# Patient Record
Sex: MALE | Race: WHITE | NOT HISPANIC OR LATINO | Employment: FULL TIME | URBAN - METROPOLITAN AREA
[De-identification: names, ages, dates, MRNs, and addresses within clinical notes are randomized per-mention and may not be internally consistent; named-entity substitution may affect disease eponyms.]

---

## 2021-02-04 ENCOUNTER — OFFICE VISIT (OUTPATIENT)
Dept: UROLOGY | Facility: CLINIC | Age: 66
End: 2021-02-04
Payer: MEDICARE

## 2021-02-04 VITALS
HEIGHT: 67 IN | WEIGHT: 148 LBS | DIASTOLIC BLOOD PRESSURE: 72 MMHG | BODY MASS INDEX: 23.23 KG/M2 | HEART RATE: 57 BPM | SYSTOLIC BLOOD PRESSURE: 120 MMHG

## 2021-02-04 DIAGNOSIS — N52.9 ERECTILE DYSFUNCTION, UNSPECIFIED ERECTILE DYSFUNCTION TYPE: Primary | ICD-10-CM

## 2021-02-04 PROCEDURE — 99203 OFFICE O/P NEW LOW 30 MIN: CPT | Mod: S$GLB,,, | Performed by: UROLOGY

## 2021-02-04 PROCEDURE — 3008F PR BODY MASS INDEX (BMI) DOCUMENTED: ICD-10-PCS | Mod: CPTII,S$GLB,, | Performed by: UROLOGY

## 2021-02-04 PROCEDURE — 3008F BODY MASS INDEX DOCD: CPT | Mod: CPTII,S$GLB,, | Performed by: UROLOGY

## 2021-02-04 PROCEDURE — 99203 PR OFFICE/OUTPT VISIT, NEW, LEVL III, 30-44 MIN: ICD-10-PCS | Mod: S$GLB,,, | Performed by: UROLOGY

## 2021-02-04 RX ORDER — IBUPROFEN 600 MG/1
TABLET ORAL
COMMUNITY
Start: 2021-01-22

## 2021-02-04 RX ORDER — ALPRAZOLAM 0.25 MG/1
TABLET ORAL
COMMUNITY
Start: 2020-12-21

## 2021-02-18 ENCOUNTER — OFFICE VISIT (OUTPATIENT)
Dept: UROLOGY | Facility: CLINIC | Age: 66
End: 2021-02-18
Payer: MEDICARE

## 2021-02-18 VITALS
HEART RATE: 72 BPM | BODY MASS INDEX: 23.23 KG/M2 | WEIGHT: 148 LBS | SYSTOLIC BLOOD PRESSURE: 134 MMHG | DIASTOLIC BLOOD PRESSURE: 79 MMHG | RESPIRATION RATE: 18 BRPM | HEIGHT: 67 IN

## 2021-02-18 DIAGNOSIS — N52.9 ERECTILE DYSFUNCTION, UNSPECIFIED ERECTILE DYSFUNCTION TYPE: Primary | ICD-10-CM

## 2021-02-18 PROCEDURE — 3008F PR BODY MASS INDEX (BMI) DOCUMENTED: ICD-10-PCS | Mod: CPTII,S$GLB,, | Performed by: UROLOGY

## 2021-02-18 PROCEDURE — 3008F BODY MASS INDEX DOCD: CPT | Mod: CPTII,S$GLB,, | Performed by: UROLOGY

## 2021-02-18 PROCEDURE — 99213 PR OFFICE/OUTPT VISIT, EST, LEVL III, 20-29 MIN: ICD-10-PCS | Mod: S$GLB,,, | Performed by: UROLOGY

## 2021-02-18 PROCEDURE — 99213 OFFICE O/P EST LOW 20 MIN: CPT | Mod: S$GLB,,, | Performed by: UROLOGY

## 2021-03-16 ENCOUNTER — TELEPHONE (OUTPATIENT)
Dept: UROLOGY | Facility: CLINIC | Age: 66
End: 2021-03-16

## 2021-03-19 LAB
ANION GAP SERPL CALC-SCNC: 6 MMOL/L (ref 3–11)
APPEARANCE, UA: CLEAR
BASOPHILS NFR BLD: 0.5 % (ref 0–3)
BILIRUB UR QL STRIP: NEGATIVE MG/DL
BUN SERPL-MCNC: 20 MG/DL (ref 7–18)
BUN/CREAT SERPL: 16.12 RATIO (ref 7–18)
CALCIUM BLD-MCNC: NEGATIVE MG/DL
CALCIUM SERPL-MCNC: 8.9 MG/DL (ref 8.8–10.5)
CHLORIDE SERPL-SCNC: 109 MMOL/L (ref 100–108)
CO2 SERPL-SCNC: 29 MMOL/L (ref 21–32)
COLOR UR: NORMAL
COVID-19 AB, IGM: NEGATIVE
CREAT SERPL-MCNC: 1.24 MG/DL (ref 0.7–1.3)
EOSINOPHIL NFR BLD: 1.5 % (ref 1–3)
ERYTHROCYTE [DISTWIDTH] IN BLOOD BY AUTOMATED COUNT: 13.6 % (ref 12.5–18)
GFR ESTIMATION: 59
GLUCOSE (UA): NORMAL MG/DL
GLUCOSE SERPL-MCNC: 94 MG/DL (ref 70–110)
HCT VFR BLD AUTO: 42.8 % (ref 42–52)
HGB BLD-MCNC: 13.7 G/DL (ref 14–18)
HGB UR QL STRIP: NEGATIVE /UL
KETONES UR QL STRIP: NEGATIVE MG/DL
LEUKOCYTE ESTERASE UR QL STRIP: NEGATIVE /UL
LYMPHOCYTES NFR BLD: 22.1 % (ref 25–40)
MCH RBC QN AUTO: 28.8 PG (ref 27–31.2)
MCHC RBC AUTO-ENTMCNC: 32 G/DL (ref 31.8–35.4)
MCV RBC AUTO: 90.1 FL (ref 80–97)
MONOCYTES NFR BLD: 7.3 % (ref 1–15)
NEUTROPHILS # BLD AUTO: 5.07 10*3/UL (ref 1.8–7.7)
NEUTROPHILS NFR BLD: 68.3 % (ref 37–80)
NITRITE UR QL STRIP: NEGATIVE
NUCLEATED RED BLOOD CELLS: 0 %
PATIENT EMPLOYED IN HEALTHCARE: NO
PATIENT HAS SYMPTOMS FOR CONDITION OF INTEREST: NO
PATIENT HOSPITALIZED BC COND: NO
PATIENT IN CONGREGATE CARE: NO
PH UR STRIP: 6 PH (ref 5–9)
PLATELETS: 202 10*3/UL (ref 142–424)
POTASSIUM SERPL-SCNC: 5.1 MMOL/L (ref 3.6–5.2)
PROT UR QL STRIP: NEGATIVE MG/DL
RBC # BLD AUTO: 4.75 10*6/UL (ref 4.7–6.1)
SODIUM BLD-SCNC: 144 MMOL/L (ref 135–145)
SP GR UR STRIP: 1.01 (ref 1–1.03)
SPECIMEN COLLECTION METHOD, URINE: NORMAL
UROBILINOGEN UR STRIP-ACNC: NORMAL MG/DL
WBC # BLD: 7.4 10*3/UL (ref 4.6–10.2)

## 2021-03-22 ENCOUNTER — OUTSIDE PLACE OF SERVICE (OUTPATIENT)
Dept: UROLOGY | Facility: CLINIC | Age: 66
End: 2021-03-22

## 2021-03-22 PROCEDURE — 54410 REMOVE/REPLACE PENIS PROSTH: CPT | Mod: ,,, | Performed by: UROLOGY

## 2021-03-22 PROCEDURE — 54410 PR REPLACE,INFLATABLE PENILE PROSTHESIS: ICD-10-PCS | Mod: ,,, | Performed by: UROLOGY

## 2021-03-23 ENCOUNTER — TELEPHONE (OUTPATIENT)
Dept: UROLOGY | Facility: CLINIC | Age: 66
End: 2021-03-23

## 2021-03-25 ENCOUNTER — OFFICE VISIT (OUTPATIENT)
Dept: UROLOGY | Facility: CLINIC | Age: 66
End: 2021-03-25
Payer: MEDICARE

## 2021-03-25 VITALS
HEART RATE: 67 BPM | WEIGHT: 158 LBS | DIASTOLIC BLOOD PRESSURE: 75 MMHG | SYSTOLIC BLOOD PRESSURE: 126 MMHG | HEIGHT: 67 IN | BODY MASS INDEX: 24.8 KG/M2

## 2021-03-25 DIAGNOSIS — N52.9 ERECTILE DYSFUNCTION, UNSPECIFIED ERECTILE DYSFUNCTION TYPE: Primary | ICD-10-CM

## 2021-03-25 PROCEDURE — 3008F BODY MASS INDEX DOCD: CPT | Mod: CPTII,S$GLB,, | Performed by: UROLOGY

## 2021-03-25 PROCEDURE — 3008F PR BODY MASS INDEX (BMI) DOCUMENTED: ICD-10-PCS | Mod: CPTII,S$GLB,, | Performed by: UROLOGY

## 2021-03-25 PROCEDURE — 99024 POSTOP FOLLOW-UP VISIT: CPT | Mod: S$GLB,,, | Performed by: UROLOGY

## 2021-03-25 PROCEDURE — 99024 PR POST-OP FOLLOW-UP VISIT: ICD-10-PCS | Mod: S$GLB,,, | Performed by: UROLOGY

## 2021-03-25 RX ORDER — CEFDINIR 300 MG/1
CAPSULE ORAL
COMMUNITY
Start: 2021-03-23 | End: 2023-01-26

## 2021-03-29 ENCOUNTER — TELEPHONE (OUTPATIENT)
Dept: UROLOGY | Facility: CLINIC | Age: 66
End: 2021-03-29

## 2021-03-30 ENCOUNTER — TELEPHONE (OUTPATIENT)
Dept: UROLOGY | Facility: CLINIC | Age: 66
End: 2021-03-30

## 2021-03-31 ENCOUNTER — TELEPHONE (OUTPATIENT)
Dept: UROLOGY | Facility: CLINIC | Age: 66
End: 2021-03-31

## 2022-08-25 DIAGNOSIS — N41.9 PROSTATITIS: Primary | ICD-10-CM

## 2023-01-26 ENCOUNTER — TELEPHONE (OUTPATIENT)
Dept: UROLOGY | Facility: CLINIC | Age: 68
End: 2023-01-26

## 2023-01-26 ENCOUNTER — OFFICE VISIT (OUTPATIENT)
Dept: UROLOGY | Facility: CLINIC | Age: 68
End: 2023-01-26
Payer: MEDICARE

## 2023-01-26 VITALS — DIASTOLIC BLOOD PRESSURE: 70 MMHG | HEART RATE: 61 BPM | SYSTOLIC BLOOD PRESSURE: 104 MMHG

## 2023-01-26 DIAGNOSIS — N52.9 ERECTILE DYSFUNCTION, UNSPECIFIED ERECTILE DYSFUNCTION TYPE: Primary | ICD-10-CM

## 2023-01-26 DIAGNOSIS — N40.0 BPH WITHOUT URINARY OBSTRUCTION: ICD-10-CM

## 2023-01-26 PROCEDURE — 99214 PR OFFICE/OUTPT VISIT, EST, LEVL IV, 30-39 MIN: ICD-10-PCS | Mod: S$GLB,,, | Performed by: NURSE PRACTITIONER

## 2023-01-26 PROCEDURE — 3074F PR MOST RECENT SYSTOLIC BLOOD PRESSURE < 130 MM HG: ICD-10-PCS | Mod: CPTII,S$GLB,, | Performed by: NURSE PRACTITIONER

## 2023-01-26 PROCEDURE — 1159F PR MEDICATION LIST DOCUMENTED IN MEDICAL RECORD: ICD-10-PCS | Mod: CPTII,S$GLB,, | Performed by: NURSE PRACTITIONER

## 2023-01-26 PROCEDURE — 3074F SYST BP LT 130 MM HG: CPT | Mod: CPTII,S$GLB,, | Performed by: NURSE PRACTITIONER

## 2023-01-26 PROCEDURE — 1160F PR REVIEW ALL MEDS BY PRESCRIBER/CLIN PHARMACIST DOCUMENTED: ICD-10-PCS | Mod: CPTII,S$GLB,, | Performed by: NURSE PRACTITIONER

## 2023-01-26 PROCEDURE — 99214 OFFICE O/P EST MOD 30 MIN: CPT | Mod: S$GLB,,, | Performed by: NURSE PRACTITIONER

## 2023-01-26 PROCEDURE — 1159F MED LIST DOCD IN RCRD: CPT | Mod: CPTII,S$GLB,, | Performed by: NURSE PRACTITIONER

## 2023-01-26 PROCEDURE — 3078F DIAST BP <80 MM HG: CPT | Mod: CPTII,S$GLB,, | Performed by: NURSE PRACTITIONER

## 2023-01-26 PROCEDURE — 3078F PR MOST RECENT DIASTOLIC BLOOD PRESSURE < 80 MM HG: ICD-10-PCS | Mod: CPTII,S$GLB,, | Performed by: NURSE PRACTITIONER

## 2023-01-26 PROCEDURE — 1160F RVW MEDS BY RX/DR IN RCRD: CPT | Mod: CPTII,S$GLB,, | Performed by: NURSE PRACTITIONER

## 2023-01-26 NOTE — TELEPHONE ENCOUNTER
----- Message from Kassandrajerad Hernández sent at 1/26/2023  4:29 PM CST -----  Contact: self  Type - Follow Up on Results    Patient went into clinic today and wants to know if his results are available for his PSA levels - please review and reach out to patient @ [phone] - thanks!

## 2023-01-26 NOTE — PROGRESS NOTES
Subjective:       Patient ID: Kyle Krishna Jr is a 67 y.o. male.    Chief Complaint: Follow-up (PSA)      HPI: 67-year-old male, patient Dr. Sewell, last seen in March 2021.    Patient has history of erectile dysfunction.    A penile prosthesis put in place by Dr. Sewell.  This is the patient's 3rd penile prosthesis.  Previous ones had failure.    Patient states prosthesis working well with no complaints.      It appears the patient was treated for prostatitis in August by his PCP.  At this time patient states he is doing well.  Denies any pain or burning urination.  Denies any penile pain.  Denies any difficulty voiding.  States he is a pretty good stream from start to finish.  Denies any odor urine.  Denies any fever or body aches.  Denies any blood in urine.  Denies any significant weight loss.    No other urinary complaints at this time.       Past Medical History:   Past Medical History:   Diagnosis Date    MS (multiple sclerosis)        Past Surgical Historical:   Past Surgical History:   Procedure Laterality Date    PENILE PROSTHESIS IMPLANT  2009    PENILE PROSTHESIS IMPLANT          Medications:   Medication List with Changes/Refills   Current Medications    ALPRAZOLAM (XANAX) 0.25 MG TABLET    TAKE 1 TABLET BY MOUTH THREE TIMES DAILY AS NEEDED FOR PANIC ATTACKS    IBUPROFEN (ADVIL,MOTRIN) 600 MG TABLET       Discontinued Medications    CEFDINIR (OMNICEF) 300 MG CAPSULE            Past Social History:   Social History     Socioeconomic History    Marital status:    Tobacco Use    Smoking status: Never   Substance and Sexual Activity    Alcohol use: Never       Allergies:   Review of patient's allergies indicates:   Allergen Reactions    Codeine         Family History: History reviewed. No pertinent family history.     Review of Systems:  Review of Systems   Constitutional:  Negative for activity change and appetite change.   HENT:  Negative for congestion and dental problem.    Eyes:  Negative  for visual disturbance.   Respiratory:  Negative for chest tightness and shortness of breath.    Cardiovascular:  Negative for chest pain.   Gastrointestinal:  Negative for abdominal distention and abdominal pain.   Genitourinary:  Negative for decreased urine volume, difficulty urinating, dysuria, enuresis, flank pain, frequency, genital sores, hematuria, penile discharge, penile pain, penile swelling, scrotal swelling, testicular pain and urgency.   Musculoskeletal:  Negative for back pain and neck pain.   Skin:  Negative for color change.   Neurological:  Negative for dizziness.   Hematological:  Negative for adenopathy.   Psychiatric/Behavioral:  Negative for agitation, behavioral problems and confusion.      Physical Exam:  Physical Exam  Vitals and nursing note reviewed.   Constitutional:       Appearance: He is well-developed.   HENT:      Head: Normocephalic.   Eyes:      Pupils: Pupils are equal, round, and reactive to light.   Cardiovascular:      Rate and Rhythm: Normal rate and regular rhythm.      Heart sounds: Normal heart sounds.   Pulmonary:      Effort: Pulmonary effort is normal.      Breath sounds: Normal breath sounds.   Abdominal:      General: Bowel sounds are normal.      Palpations: Abdomen is soft.   Genitourinary:     Penis: Normal.       Prostate: Enlarged (prostate is enlarged.  Prostate smooth with no nodules and nontender.  Prostate is symmetrical.).      Rectum: Normal.      Comments: Penile prosthesis is intact.  Able cycle prosthesis without difficulty and patient tolerated well.  Musculoskeletal:         General: Normal range of motion.      Cervical back: Normal range of motion and neck supple.   Skin:     General: Skin is warm and dry.   Neurological:      Mental Status: He is alert and oriented to person, place, and time.   Psychiatric:         Behavior: Behavior normal.     Urinalysis: Normal    Assessment/Plan:   1. BPH without obstruction:  Will check patient's PSA.  We will  notify him of the results.    Patient request copy sent to PCP.    2. Erectile dysfunction:  Penile prosthesis is intact.  Able to cycle prosthesis without difficulty.      Will plan follow-up in 1 year, sooner if needed.  Problem List Items Addressed This Visit    None  Visit Diagnoses       Erectile dysfunction, unspecified erectile dysfunction type    -  Primary    BPH without urinary obstruction        Relevant Orders    Prostate Specific Antigen, Diagnostic

## 2023-01-30 ENCOUNTER — TELEPHONE (OUTPATIENT)
Dept: UROLOGY | Facility: CLINIC | Age: 68
End: 2023-01-30
Payer: MEDICARE

## 2023-01-30 DIAGNOSIS — R97.20 ELEVATED PSA: Primary | ICD-10-CM

## 2023-01-30 NOTE — TELEPHONE ENCOUNTER
----- Message from Huong Enirquez sent at 1/30/2023 10:54 AM CST -----  Pt is calling in regards to getting labs results. Pt can be reached at [phone] (home)

## 2023-01-30 NOTE — TELEPHONE ENCOUNTER
Called patient with results found in media with NP, Landon Alicia recommendation of a biopsy due to elevated PSA. Patient verbalized understanding and said he would like to move forward with scheduling of biopsy.

## 2023-02-03 DIAGNOSIS — R97.20 ELEVATED PSA: Primary | ICD-10-CM

## 2023-02-03 RX ORDER — DIAZEPAM 10 MG/1
10 TABLET ORAL ONCE
Qty: 1 TABLET | Refills: 0 | Status: SHIPPED | OUTPATIENT
Start: 2023-02-13 | End: 2023-04-10

## 2023-02-03 RX ORDER — CIPROFLOXACIN 500 MG/1
500 TABLET ORAL 2 TIMES DAILY
Qty: 8 TABLET | Refills: 0 | Status: SHIPPED | OUTPATIENT
Start: 2023-02-12 | End: 2023-02-16

## 2023-02-06 ENCOUNTER — CLINICAL SUPPORT (OUTPATIENT)
Dept: UROLOGY | Facility: CLINIC | Age: 68
End: 2023-02-06
Payer: MEDICARE

## 2023-02-06 NOTE — PROGRESS NOTES
Pt here for Prostate Biopsy education.  Pre/post procedure information reviewed and discussed.  All questions answered.  Scripts for Cipro and Valium given to patient.  Consents signed.  Encouraged to call with questions or concerns.

## 2023-02-09 ENCOUNTER — TELEPHONE (OUTPATIENT)
Dept: UROLOGY | Facility: CLINIC | Age: 68
End: 2023-02-09
Payer: MEDICARE

## 2023-02-09 NOTE — TELEPHONE ENCOUNTER
"Patient stated he wants to cancel biopsy. He does not want to reschedule at this time. He stated he does not want to take the valium and "put that kind of medicine into my body"/ he stated he understands this is the way we do our biopsies but he doesn't want to do this. I educated pt that this is ordered to check/eval for possible prostate cancer due to elevated PSA and that him cancelling and not rescheduling he understands the risk. Pt verbalized understanding. HMLpn  "

## 2023-02-09 NOTE — TELEPHONE ENCOUNTER
----- Message from Jen Schroeder sent at 2/9/2023  3:03 PM CST -----  Contact: pt  Pt calling to cancel appt for Monday and  he can be reached at [phone]     Thanks,

## 2023-03-14 ENCOUNTER — TELEPHONE (OUTPATIENT)
Dept: UROLOGY | Facility: CLINIC | Age: 68
End: 2023-03-14
Payer: MEDICARE

## 2023-03-14 NOTE — TELEPHONE ENCOUNTER
----- Message from Reanna Livingston sent at 3/14/2023 10:07 AM CDT -----  Contact: self  States he does not want to do the enema and needs to see what else can be done. Please call back at [phone]

## 2023-03-14 NOTE — TELEPHONE ENCOUNTER
Pt does not want to take enema. Discussed with dR. Sewell he stated pt would need to have a good BM atleast 4 hours prior to procedure, pt verbalized understanding. He wants to be rescheduled for biopsy. Message sent to maile. Zackeryn

## 2023-04-10 ENCOUNTER — CLINICAL SUPPORT (OUTPATIENT)
Dept: UROLOGY | Facility: CLINIC | Age: 68
End: 2023-04-10
Payer: MEDICARE

## 2023-04-10 DIAGNOSIS — R97.20 ELEVATED PSA: Primary | ICD-10-CM

## 2023-04-10 RX ORDER — DIAZEPAM 10 MG/1
10 TABLET ORAL ONCE
Qty: 1 TABLET | Refills: 0 | Status: SHIPPED | OUTPATIENT
Start: 2023-04-10 | End: 2023-04-10

## 2023-04-10 RX ORDER — POLYETHYLENE GLYCOL 3350, SODIUM SULFATE ANHYDROUS, SODIUM BICARBONATE, SODIUM CHLORIDE, POTASSIUM CHLORIDE 236; 22.74; 6.74; 5.86; 2.97 G/4L; G/4L; G/4L; G/4L; G/4L
240 POWDER, FOR SOLUTION ORAL ONCE
Qty: 240 ML | Refills: 0 | Status: SHIPPED | OUTPATIENT
Start: 2023-04-10 | End: 2023-04-10

## 2023-04-10 RX ORDER — POLYETHYLENE GLYCOL 3350, SODIUM SULFATE ANHYDROUS, SODIUM BICARBONATE, SODIUM CHLORIDE, POTASSIUM CHLORIDE 236; 22.74; 6.74; 5.86; 2.97 G/4L; G/4L; G/4L; G/4L; G/4L
240 POWDER, FOR SOLUTION ORAL ONCE
Qty: 240 ML | Refills: 0 | Status: CANCELLED | OUTPATIENT
Start: 2023-04-10 | End: 2023-04-10

## 2023-04-10 RX ORDER — CIPROFLOXACIN 500 MG/1
500 TABLET ORAL 2 TIMES DAILY
Qty: 8 TABLET | Refills: 0 | Status: CANCELLED | OUTPATIENT
Start: 2023-04-10

## 2023-04-10 RX ORDER — CIPROFLOXACIN 500 MG/1
500 TABLET ORAL 2 TIMES DAILY
Qty: 8 TABLET | Refills: 0 | Status: SHIPPED | OUTPATIENT
Start: 2023-04-10

## 2023-04-10 RX ORDER — DIAZEPAM 10 MG/1
10 TABLET ORAL ONCE
Qty: 1 TABLET | Refills: 0 | Status: CANCELLED | OUTPATIENT
Start: 2023-04-10 | End: 2023-04-10

## 2023-04-17 ENCOUNTER — TELEPHONE (OUTPATIENT)
Dept: UROLOGY | Facility: CLINIC | Age: 68
End: 2023-04-17
Payer: MEDICARE

## 2023-04-17 NOTE — TELEPHONE ENCOUNTER
Pt calling regarding his valium. He asked if he could take his xanax as well. I asked pt to please not take this due to taking valium. Pt verbalized understanding.     ----- Message from Dennise Godinez sent at 4/17/2023 10:55 AM CDT -----  Contact: Patient  Patient called to consult with nurse or staff regarding his upcoming biopsy. He would like to speak with nurse about the medication he can take once he is at the office for the procedure and would like a call back. He can be reached at [phone]. Thanks/MR

## 2023-04-24 ENCOUNTER — TELEPHONE (OUTPATIENT)
Dept: UROLOGY | Facility: CLINIC | Age: 68
End: 2023-04-24
Payer: MEDICARE

## 2023-04-24 NOTE — TELEPHONE ENCOUNTER
Pt calling when to drink the golitley. Informed pt to start drinking tonight at 5.     ----- Message from Tess Manzano sent at 4/24/2023  9:56 AM CDT -----  Patient would like a call back concerning pre for procedure tomorrow..Please call him back at [phone]

## 2023-04-25 ENCOUNTER — PROCEDURE VISIT (OUTPATIENT)
Dept: UROLOGY | Facility: CLINIC | Age: 68
End: 2023-04-25
Payer: MEDICARE

## 2023-04-25 VITALS
OXYGEN SATURATION: 97 % | SYSTOLIC BLOOD PRESSURE: 155 MMHG | RESPIRATION RATE: 16 BRPM | WEIGHT: 159.81 LBS | HEART RATE: 71 BPM | DIASTOLIC BLOOD PRESSURE: 73 MMHG | BODY MASS INDEX: 25.03 KG/M2

## 2023-04-25 DIAGNOSIS — R97.20 ELEVATED PSA: Primary | ICD-10-CM

## 2023-04-25 PROCEDURE — 76872 US TRANSRECTAL: CPT | Mod: S$GLB,,, | Performed by: UROLOGY

## 2023-04-25 PROCEDURE — 55700 TRANSRECTAL ULTRASOUND W/ BIOPSY: ICD-10-PCS | Mod: S$GLB,,, | Performed by: UROLOGY

## 2023-04-25 PROCEDURE — 76872 TRANSRECTAL ULTRASOUND W/ BIOPSY: ICD-10-PCS | Mod: S$GLB,,, | Performed by: UROLOGY

## 2023-04-25 PROCEDURE — 55700 TRANSRECTAL ULTRASOUND W/ BIOPSY: CPT | Mod: S$GLB,,, | Performed by: UROLOGY

## 2023-04-25 NOTE — PROCEDURES
"Transrectal Ultrasound w/ Biopsy    Date/Time: 4/25/2023 2:30 PM  Performed by: Damien Sewell MD  Authorized by: Landon Alicia NP     Consent Done?:  Yes (Written)  Time out: Immediately prior to procedure a "time out" was called to verify the correct patient, procedure, equipment, support staff and site/side marked as required.    Indications: Elevated PSA    Position:  Left lateral  Anesthesia:  Pudendal nerve block  Patient sedated: No    Prostate Size:  35g  Left Base Biopsies: 2  Left Mid Biopsies: 2  Left [place] Biopsies: 2  Right Base Biopsies: 2  Right Mid Biopsies: 2  Right [place] Biopsies: 2  Total Biopsies:  12    Patient tolerance:  Patient tolerated the procedure well with no immediate complications     Patient was brought to the procedure room placed on the table in left lateral decubitus position lidocaine jelly was instilled into the rectum the ultrasound probe was advanced to level of prostate and a total of 10 cc of lidocaine was injected into the bilateral nam prostatic fossa.  A standard 12 core prostate biopsy was obtained patient tolerated the procedure well there were no complications  "

## 2023-04-25 NOTE — PATIENT INSTRUCTIONS
Patient Education       Prostate Biopsy Discharge Instructions   About this topic   The prostate is a part of your body that helps make semen. The prostate is located at the base of the penis and in front of the rectum.  Prostate biopsy is done:  To help your doctor know if the lump or tumor in your prostate is cancer or not.  If your blood test, called PSA or prostate specific antigen, is high. High PSA in the blood means disease in the prostate.  During a prostate biopsy, the doctor uses a needle to collect pieces of tissue from the prostate. The doctor sends the tissue to the lab. The lab then checks the tissue for infection or cancer.     What care is needed at home?   Ask your doctor what you need to do when you go home. Make sure you ask questions if you do not understand what the doctor says. This way you will know what you need to do.  Rest after the procedure to prevent bleeding from the biopsy site. Avoid activities like heavy lifting and hard exercise.  You may see some blood in your urine or stools for the next few days. You may also have blood in your semen for a few weeks.  Drink up to 8 glasses of water a day to help flush out blood.  Use an ice pack to help with the pain and to help stop the bleeding for the first 2 days after the procedure. Place an ice pack or a bag of frozen peas wrapped in a towel over the painful part. Never put ice right on the skin. Do not leave the ice on more than 10 to 15 minutes at a time. Use ice each hour as needed.  Keep your rectal opening and penis clean to prevent infection.  Keep your wound dry for the next 24 hours. Ask your doctor about when you may take a bath or shower.  Change the dressing if it gets soaked.  What follow-up care is needed?   Your doctor may ask you to make visits to the office to check on your progress. Be sure to keep your visits.  It may take up to 2 weeks for your doctor to get the results. You may be asked to return to the doctor's office  for the result of the biopsy in 2 to 3 weeks. The results will help your doctor understand what kind of problem you have with your prostate. Together you can make a plan for more care.  Your doctor will talk with you if any other treatment is needed.  What drugs may be needed?   The doctor may order drugs to:  Help with pain  Prevent infection  Will physical activity be limited?   Avoid doing activities that may put pressure on your rectal area for the next 7 days. You may be more comfortable if you do not ride a bike, horse, or motorcycle.  Limit your sexual activity for a few days after the procedure. Ask your doctor when you can have sex.  Do not strain when going to the bathroom. Don't hold your urine. Holding back from urinating can irritate your bladder and lead to a urinary tract infection.  Avoid constipation by eating foods high in fiber and staying hydrated. Straining to pass stool can worsen your symptoms as you heal.  What problems could happen?   Infection at the biopsy site  Infection elsewhere in your body  Bleeding from your rectum, or blood in your urine or semen  Tumor spread  Very bad pain  Bladder or rectum perforation  Urinary tract infection  Trouble passing urine  Erectile dysfunction  Reduced sexual activity  When do I need to call the doctor?   Signs of infection like fever of 100.4°F (38°C) or higher, chills, burning or pain when you pass urine.  No urine or more problems passing urine  Dizziness, confusion, or weakness  Yellowish, greenish, or bloody discharge from the penis  Lots of rectal bleeding or large amounts of blood in the urine  Pain does not go away even after taking your drugs  Teach Back: Helping You Understand   The Teach Back Method helps you understand the information we are giving you. After you talk with the staff, tell them in your own words what you learned. This helps to make sure the staff has described each thing clearly. It also helps to explain things that may have  been confusing. Before going home, make sure you can do these:  I can tell you about my procedure.  I can tell you what may help ease my pain.  I can tell you what I will do if I have a fever, chills, problems passing urine, or drainage from my penis.  NO LIFTING GREATER THAN 10 LBS  NO STRENUOUS ACTIVITY FOR 3 DAYS  NO SEXUAL ACTIVITY FOR 7 DAYS    Where can I learn more?   American Cancer Society  https://www.cancer.org/cancer/prostate-cancer/jxypjaxdk-acykwgggu-abskcrg/how-diagnosed.html   [employer] of [place]  https://www.radiologyinfo.org/en/info.cfm?pg=prostate-biopsy   Last Reviewed Date   2021-05-18  Consumer Information Use and Disclaimer   This information is not specific medical advice and does not replace information you receive from your health care provider. This is only a brief summary of general information. It does NOT include all information about conditions, illnesses, injuries, tests, procedures, treatments, therapies, discharge instructions or life-style choices that may apply to you. You must talk with your health care provider for complete information about your health and treatment options. This information should not be used to decide whether or not to accept your health care providers advice, instructions or recommendations. Only your health care provider has the knowledge and training to provide advice that is right for you.  Copyright   Copyright © 2021 [employer] and its affiliates and/or licensors. All rights reserved.

## 2023-05-02 ENCOUNTER — OFFICE VISIT (OUTPATIENT)
Dept: UROLOGY | Facility: CLINIC | Age: 68
End: 2023-05-02
Payer: MEDICARE

## 2023-05-02 ENCOUNTER — TELEPHONE (OUTPATIENT)
Dept: UROLOGY | Facility: CLINIC | Age: 68
End: 2023-05-02

## 2023-05-02 DIAGNOSIS — C61 PROSTATE CANCER: Primary | ICD-10-CM

## 2023-05-02 PROCEDURE — 99214 PR OFFICE/OUTPT VISIT, EST, LEVL IV, 30-39 MIN: ICD-10-PCS | Mod: S$GLB,,, | Performed by: UROLOGY

## 2023-05-02 PROCEDURE — 1160F RVW MEDS BY RX/DR IN RCRD: CPT | Mod: CPTII,S$GLB,, | Performed by: UROLOGY

## 2023-05-02 PROCEDURE — 1159F PR MEDICATION LIST DOCUMENTED IN MEDICAL RECORD: ICD-10-PCS | Mod: CPTII,S$GLB,, | Performed by: UROLOGY

## 2023-05-02 PROCEDURE — 1160F PR REVIEW ALL MEDS BY PRESCRIBER/CLIN PHARMACIST DOCUMENTED: ICD-10-PCS | Mod: CPTII,S$GLB,, | Performed by: UROLOGY

## 2023-05-02 PROCEDURE — 99214 OFFICE O/P EST MOD 30 MIN: CPT | Mod: S$GLB,,, | Performed by: UROLOGY

## 2023-05-02 PROCEDURE — 1159F MED LIST DOCD IN RCRD: CPT | Mod: CPTII,S$GLB,, | Performed by: UROLOGY

## 2023-05-02 NOTE — PROGRESS NOTES
Subjective:       Patient ID: Kyle Krishna Jr is a 67 y.o. male.    Chief Complaint: Biopsy results(initial)      HPI:  67-year-old male with initial diagnosis of low-grade prostate cancer Brian 6 in 1 of 12 cores 36% PSA was 5.37 he is here for further discussion    Past Medical History:   Past Medical History:   Diagnosis Date    MS (multiple sclerosis)        Past Surgical Historical:   Past Surgical History:   Procedure Laterality Date    PENILE PROSTHESIS IMPLANT  2009    PENILE PROSTHESIS IMPLANT          Medications:   Medication List with Changes/Refills   Current Medications    ALPRAZOLAM (XANAX) 0.25 MG TABLET    TAKE 1 TABLET BY MOUTH THREE TIMES DAILY AS NEEDED FOR PANIC ATTACKS    CIPROFLOXACIN HCL (CIPRO) 500 MG TABLET    Take 1 tablet (500 mg total) by mouth 2 (two) times daily.    DIAZEPAM (VALIUM) 10 MG TAB    Take 1 tablet (10 mg total) by mouth once. for 1 dose    IBUPROFEN (ADVIL,MOTRIN) 600 MG TABLET            Past Social History:   Social History     Socioeconomic History    Marital status:    Tobacco Use    Smoking status: Never   Substance and Sexual Activity    Alcohol use: Never       Allergies:   Review of patient's allergies indicates:   Allergen Reactions    Codeine         Family History: History reviewed. No pertinent family history.     Review of Systems:  Review of Systems   Constitutional:  Negative for activity change and appetite change.   HENT:  Negative for congestion and dental problem.    Eyes:  Negative for visual disturbance.   Respiratory:  Negative for chest tightness and shortness of breath.    Cardiovascular:  Negative for chest pain.   Gastrointestinal:  Negative for abdominal distention and abdominal pain.   Endocrine: Negative for polyuria.   Genitourinary:  Negative for difficulty urinating, dysuria, flank pain, frequency, hematuria, penile discharge, penile pain, penile swelling, scrotal swelling, testicular pain and urgency.   Musculoskeletal:   Negative for back pain and neck pain.   Skin:  Negative for color change.   Allergic/Immunologic: Positive for immunocompromised state.   Neurological:  Negative for dizziness.   Hematological:  Negative for adenopathy.   Psychiatric/Behavioral:  Negative for agitation, behavioral problems and confusion.      Physical Exam:  Physical Exam  Constitutional:       General: He is not in acute distress.     Appearance: He is well-developed.   HENT:      Head: Normocephalic and atraumatic.      Nose: Nose normal.   Eyes:      General: No scleral icterus.     Conjunctiva/sclera: Conjunctivae normal.      Pupils: Pupils are equal, round, and reactive to light.   Neck:      Thyroid: No thyromegaly.      Trachea: No tracheal deviation.   Cardiovascular:      Rate and Rhythm: Normal rate and regular rhythm.      Heart sounds: Normal heart sounds.   Pulmonary:      Effort: Pulmonary effort is normal. No respiratory distress.      Breath sounds: Normal breath sounds. No wheezing or rales.   Abdominal:      General: Bowel sounds are normal. There is no distension.      Palpations: Abdomen is soft.      Tenderness: There is no abdominal tenderness. There is no guarding or rebound.   Genitourinary:     Penis: Normal. No tenderness.       Prostate: Normal.   Musculoskeletal:         General: No deformity. Normal range of motion.      Cervical back: Neck supple.   Lymphadenopathy:      Cervical: No cervical adenopathy.   Skin:     General: Skin is warm and dry.      Findings: No erythema or rash.   Neurological:      Mental Status: He is alert and oriented to person, place, and time.      Cranial Nerves: No cranial nerve deficit.   Psychiatric:         Behavior: Behavior normal.       Assessment/Plan:       Problem List Items Addressed This Visit    None  Visit Diagnoses       Prostate cancer    -  Primary               I had a long discussion with the patient regarding treatment for prostate cancer and all of the available options.   We discussed external beam radiation and all of the risks and benefits, discussed brachytherapy as well as robotic assisted radical prostatectomy.  We had a long discussion on surgery itself including the risks of worsening erectile dysfunction, urinary incontinence, injury to the bowel or anastomotic leak.  All questions were answered the patient reported having a clear understanding of the treatment of prostate cancer and the risk and benefits of each modality.  The appropriate amountof time was spent in face-to-face communication discussing the treatment for prostate cancer.  Decision was made to proceed with active surveillance we will repeat PSA in 4 months

## 2023-05-02 NOTE — TELEPHONE ENCOUNTER
Contacted pt asked what kind of mower he has, he states he has a automatic push mower, advised that is fine. Pt states it pushes itself. Verbalized understanding. BJP    ----- Message from Tess Manzano sent at 5/2/2023  3:30 PM CDT -----  Patient would like a call back concerning if he can cut grass..Please call him back at [phone]

## 2023-05-05 ENCOUNTER — TELEPHONE (OUTPATIENT)
Dept: UROLOGY | Facility: CLINIC | Age: 68
End: 2023-05-05
Payer: MEDICARE

## 2023-05-05 NOTE — TELEPHONE ENCOUNTER
Returned request for call back. Patient wanted to know when he could resume his regular exercise routine since having prostate biopsy on 4/25/2023. Instructed patient that he was only on streneous activity restrictions for 3-4 days & that he could resume normal exercise routine. Verbalized understanding.               ----- Message from Tess Manzano sent at 5/5/2023 10:15 AM CDT -----  Patient is calling in regards to when can he resume his normal routine of exercise..Please call him back at [phone]

## 2023-09-06 ENCOUNTER — CLINICAL SUPPORT (OUTPATIENT)
Dept: UROLOGY | Facility: CLINIC | Age: 68
End: 2023-09-06
Payer: MEDICARE

## 2023-09-06 DIAGNOSIS — C61 PROSTATE CANCER: Primary | ICD-10-CM

## 2023-09-06 LAB — PSA, DIAGNOSTIC: 5.65 NG/ML (ref 0.1–4)

## 2023-09-06 PROCEDURE — 36415 COLL VENOUS BLD VENIPUNCTURE: CPT | Mod: S$GLB,,, | Performed by: UROLOGY

## 2023-09-06 PROCEDURE — 36415 PR COLLECTION VENOUS BLOOD,VENIPUNCTURE: ICD-10-PCS | Mod: S$GLB,,, | Performed by: UROLOGY

## 2023-09-06 NOTE — PROGRESS NOTES
PSA draw done via Left F/A with 23 ga butterlfy stick x 1.  Gauze and coban applied.  Tolerated well.

## 2023-09-14 ENCOUNTER — TELEPHONE (OUTPATIENT)
Dept: UROLOGY | Facility: CLINIC | Age: 68
End: 2023-09-14

## 2023-09-14 ENCOUNTER — OFFICE VISIT (OUTPATIENT)
Dept: UROLOGY | Facility: CLINIC | Age: 68
End: 2023-09-14
Payer: MEDICARE

## 2023-09-14 VITALS
OXYGEN SATURATION: 98 % | TEMPERATURE: 98 F | DIASTOLIC BLOOD PRESSURE: 78 MMHG | HEART RATE: 56 BPM | WEIGHT: 159 LBS | BODY MASS INDEX: 24.96 KG/M2 | HEIGHT: 67 IN | SYSTOLIC BLOOD PRESSURE: 144 MMHG

## 2023-09-14 DIAGNOSIS — C61 PROSTATE CANCER: Primary | ICD-10-CM

## 2023-09-14 PROCEDURE — 3008F PR BODY MASS INDEX (BMI) DOCUMENTED: ICD-10-PCS | Mod: CPTII,S$GLB,, | Performed by: UROLOGY

## 2023-09-14 PROCEDURE — 3008F BODY MASS INDEX DOCD: CPT | Mod: CPTII,S$GLB,, | Performed by: UROLOGY

## 2023-09-14 PROCEDURE — 3078F DIAST BP <80 MM HG: CPT | Mod: CPTII,S$GLB,, | Performed by: UROLOGY

## 2023-09-14 PROCEDURE — 1126F PR PAIN SEVERITY QUANTIFIED, NO PAIN PRESENT: ICD-10-PCS | Mod: CPTII,S$GLB,, | Performed by: UROLOGY

## 2023-09-14 PROCEDURE — 1159F PR MEDICATION LIST DOCUMENTED IN MEDICAL RECORD: ICD-10-PCS | Mod: CPTII,S$GLB,, | Performed by: UROLOGY

## 2023-09-14 PROCEDURE — 1126F AMNT PAIN NOTED NONE PRSNT: CPT | Mod: CPTII,S$GLB,, | Performed by: UROLOGY

## 2023-09-14 PROCEDURE — 3078F PR MOST RECENT DIASTOLIC BLOOD PRESSURE < 80 MM HG: ICD-10-PCS | Mod: CPTII,S$GLB,, | Performed by: UROLOGY

## 2023-09-14 PROCEDURE — 99214 PR OFFICE/OUTPT VISIT, EST, LEVL IV, 30-39 MIN: ICD-10-PCS | Mod: S$GLB,,, | Performed by: UROLOGY

## 2023-09-14 PROCEDURE — 3077F SYST BP >= 140 MM HG: CPT | Mod: CPTII,S$GLB,, | Performed by: UROLOGY

## 2023-09-14 PROCEDURE — 1159F MED LIST DOCD IN RCRD: CPT | Mod: CPTII,S$GLB,, | Performed by: UROLOGY

## 2023-09-14 PROCEDURE — 3077F PR MOST RECENT SYSTOLIC BLOOD PRESSURE >= 140 MM HG: ICD-10-PCS | Mod: CPTII,S$GLB,, | Performed by: UROLOGY

## 2023-09-14 PROCEDURE — 99214 OFFICE O/P EST MOD 30 MIN: CPT | Mod: S$GLB,,, | Performed by: UROLOGY

## 2023-09-14 NOTE — PROGRESS NOTES
Subjective:       Patient ID: Kyle Krishna Jr is a 68 y.o. male.    Chief Complaint: Elevated PSA      HPI: 68-year-old male patient presenting to clinic for 4 month follow up after initial diagnosis of low-grade prostate cancer [place] 6 in 1 of 12 cores.  He is here for repeat PSA.  At his last visit he chose to continue with active surveillance.    09/06/2023  5.65  01/26/2023  5.37    04/25/2023 initial biopsy 1/12 cores [place] 636% PSA was 5.37       Past Medical History:   Past Medical History:   Diagnosis Date    MS (multiple sclerosis)        Past Surgical Historical:   Past Surgical History:   Procedure Laterality Date    PENILE PROSTHESIS IMPLANT  2009    PENILE PROSTHESIS IMPLANT          Medications:   Medication List with Changes/Refills   Current Medications    ALPRAZOLAM (XANAX) 0.25 MG TABLET    TAKE 1 TABLET BY MOUTH THREE TIMES DAILY AS NEEDED FOR PANIC ATTACKS    CIPROFLOXACIN HCL (CIPRO) 500 MG TABLET    Take 1 tablet (500 mg total) by mouth 2 (two) times daily.    DIAZEPAM (VALIUM) 10 MG TAB    Take 1 tablet (10 mg total) by mouth once. for 1 dose    IBUPROFEN (ADVIL,MOTRIN) 600 MG TABLET            Past Social History:   Social History     Socioeconomic History    Marital status:    Tobacco Use    Smoking status: Never   Substance and Sexual Activity    Alcohol use: Never       Allergies:   Review of patient's allergies indicates:   Allergen Reactions    Codeine         Family History: History reviewed. No pertinent family history.     Review of Systems:  Review of Systems   Constitutional: Negative.    HENT: Negative.     Eyes: Negative.    Respiratory: Negative.     Cardiovascular: Negative.    Gastrointestinal: Negative.    Endocrine: Negative.    Genitourinary: Negative.    Musculoskeletal: Negative.    Skin: Negative.    Allergic/Immunologic: Negative.    Neurological: Negative.    Hematological: Negative.    Psychiatric/Behavioral: Negative.         Physical Exam:  Physical  Exam  Constitutional:       Appearance: He is normal weight.   HENT:      Head: Normocephalic.      Nose: Nose normal.      Mouth/Throat:      Mouth: Mucous membranes are moist.      Pharynx: Oropharynx is clear.   Eyes:      Extraocular Movements: Extraocular movements intact.      Conjunctiva/sclera: Conjunctivae normal.      Pupils: Pupils are equal, round, and reactive to light.   Cardiovascular:      Rate and Rhythm: Normal rate and regular rhythm.      Pulses: Normal pulses.      Heart sounds: Normal heart sounds.   Pulmonary:      Effort: Pulmonary effort is normal.      Breath sounds: Normal breath sounds.   Abdominal:      General: Abdomen is flat. Bowel sounds are normal.      Palpations: Abdomen is soft.      Hernia: There is no hernia in the right inguinal area or left inguinal area.   Genitourinary:     Penis: Normal. No phimosis, paraphimosis, hypospadias, erythema, tenderness or discharge.       Testes: Normal.         Right: Mass, tenderness or swelling not present. Right testis is descended. Cremasteric reflex is present.          Left: Mass, tenderness or swelling not present. Left testis is descended. Cremasteric reflex is present.       Prostate: Normal.      Rectum: Normal.   Musculoskeletal:         General: Normal range of motion.      Cervical back: Normal range of motion and neck supple.   Lymphadenopathy:      Lower Body: No right inguinal adenopathy. No left inguinal adenopathy.   Skin:     General: Skin is warm and dry.      Capillary Refill: Capillary refill takes less than 2 seconds.   Neurological:      General: No focal deficit present.      Mental Status: He is alert and oriented to person, place, and time. Mental status is at baseline.   Psychiatric:         Mood and Affect: Mood normal.         Behavior: Behavior normal.         Thought Content: Thought content normal.         Judgment: Judgment normal.       Assessment/Plan:     Prostate cancer:  Continue with active surveillance.   Return to clinic in 4 months for repeat PSA.  Patient will be due for repeat biopsy in April of 2024.  No other issues at this time.    I, Dr. Damien Sewell have seen and personally evaluated the patient. I have formulated the plan reviewed all pertinent imaging and clinical data.  I agree with the nurse practitioner's assessment, and I have personally formulated the plan for this patient's care as described by the midlevel.    We would a discussion about treating this patient is low risk prostate cancer as his kids are concerned about why he is not having surgery.  For now we will repeat PSA in 4 months his biopsy will be due in April of 2024 we will make decisions thereafter  Problem List Items Addressed This Visit    None

## 2023-09-14 NOTE — TELEPHONE ENCOUNTER
Spoke with pt and informed him that we have sent results over.     ----- Message from Shadia Enriquez sent at 9/14/2023  2:01 PM CDT -----  Contact: Kyle Wright is calling to get his results fax to his PCP Dr.Jason Moreno at [phone] and or call him the patient at [phone].    Thanks  Td

## 2023-09-25 ENCOUNTER — TELEPHONE (OUTPATIENT)
Dept: UROLOGY | Facility: CLINIC | Age: 68
End: 2023-09-25
Payer: MEDICARE

## 2023-09-25 NOTE — TELEPHONE ENCOUNTER
Contacted pt, he's requesting copy of PSA results. Advised that I would print for him to pickup. Pt states he's having rectal pain, asked if it was just the rectum or is it pain to testicle, scrotum. Pt states just rectal where feces exit. Advised pt that he may need to f/u with his primary. PT states he have, they will be proceeding with Colonoscopy. BJP    ----- Message from Tess Manzano sent at 9/25/2023  8:39 AM CDT -----  Patient is calling in regards to would like a copy of last labs...Please call him back at [phone].            Thanks  [facility]

## 2023-10-20 ENCOUNTER — TELEPHONE (OUTPATIENT)
Dept: UROLOGY | Facility: CLINIC | Age: 68
End: 2023-10-20
Payer: MEDICARE

## 2023-10-20 NOTE — TELEPHONE ENCOUNTER
Pt called asking if he could take an over the counter testosterone supplement. Pt was diagnosed with [place] 6 prostate cancer. I spoke with provider and she stated pt should not take this due to prostate cancer feeding off of testosterone and that could increase his risk of high dotty. Pt verbalized understand.

## 2023-10-20 NOTE — TELEPHONE ENCOUNTER
Attempted to return pt call, left [facility].     ----- Message from Jen Schroeder sent at 10/20/2023  1:22 PM CDT -----  Contact: pt  Pt calling wanting to know if it will hurt him if he taken nugenix.  Pt can be reached at [phone] .    Thanks,

## 2024-01-17 ENCOUNTER — CLINICAL SUPPORT (OUTPATIENT)
Dept: UROLOGY | Facility: CLINIC | Age: 69
End: 2024-01-17
Payer: MEDICARE

## 2024-01-17 DIAGNOSIS — R97.20 ELEVATED PSA: ICD-10-CM

## 2024-01-17 DIAGNOSIS — C61 PROSTATE CANCER: Primary | ICD-10-CM

## 2024-01-17 LAB — PSA, DIAGNOSTIC: 5.02 NG/ML (ref 0.1–4)

## 2024-01-17 PROCEDURE — 36415 COLL VENOUS BLD VENIPUNCTURE: CPT | Mod: S$GLB,,, | Performed by: UROLOGY

## 2024-01-17 NOTE — PROGRESS NOTES
Short visit for PSA draw only. Blood taken from left forearm with no adverse reactions or concerns.

## 2024-01-23 ENCOUNTER — OFFICE VISIT (OUTPATIENT)
Dept: UROLOGY | Facility: CLINIC | Age: 69
End: 2024-01-23
Payer: MEDICARE

## 2024-01-23 VITALS — HEIGHT: 67 IN | WEIGHT: 156 LBS | BODY MASS INDEX: 24.48 KG/M2

## 2024-01-23 DIAGNOSIS — C61 PROSTATE CANCER: Primary | ICD-10-CM

## 2024-01-23 PROCEDURE — 99214 OFFICE O/P EST MOD 30 MIN: CPT | Mod: S$GLB,,, | Performed by: UROLOGY

## 2024-01-23 PROCEDURE — 1126F AMNT PAIN NOTED NONE PRSNT: CPT | Mod: CPTII,S$GLB,, | Performed by: UROLOGY

## 2024-01-23 PROCEDURE — 3288F FALL RISK ASSESSMENT DOCD: CPT | Mod: CPTII,S$GLB,, | Performed by: UROLOGY

## 2024-01-23 PROCEDURE — 3008F BODY MASS INDEX DOCD: CPT | Mod: CPTII,S$GLB,, | Performed by: UROLOGY

## 2024-01-23 PROCEDURE — 1159F MED LIST DOCD IN RCRD: CPT | Mod: CPTII,S$GLB,, | Performed by: UROLOGY

## 2024-01-23 PROCEDURE — 1101F PT FALLS ASSESS-DOCD LE1/YR: CPT | Mod: CPTII,S$GLB,, | Performed by: UROLOGY

## 2024-01-23 RX ORDER — HYOSCYAMINE SULFATE 0.125 MG
125 TABLET ORAL
COMMUNITY
Start: 2023-11-29

## 2024-01-23 RX ORDER — ALPRAZOLAM 0.5 MG/1
0.5 TABLET ORAL
COMMUNITY
Start: 2023-12-24

## 2024-01-23 NOTE — PROGRESS NOTES
Subjective:       Patient ID: Kyle Krishna Jr is a 68 y.o. male.    Chief Complaint: No chief complaint on file.      HPI: 68-year-old male patient presenting to clinic for 4 month follow up for prostate cancer on active surveillance diagnosis of low-grade prostate cancer [place] 6 in 1 of 12 cores.     01/17/2024  5.02  09/06/2023  5.65  01/26/2023  5.37    04/25/2023 initial biopsy 1/12 cores [place] 6 36% PSA was 5.37         Past Medical History:   Past Medical History:   Diagnosis Date    MS (multiple sclerosis)        Past Surgical Historical:   Past Surgical History:   Procedure Laterality Date    PENILE PROSTHESIS IMPLANT  2009    PENILE PROSTHESIS IMPLANT          Medications:   Medication List with Changes/Refills   Current Medications    ALPRAZOLAM (XANAX) 0.25 MG TABLET    TAKE 1 TABLET BY MOUTH THREE TIMES DAILY AS NEEDED FOR PANIC ATTACKS    ALPRAZOLAM (XANAX) 0.5 MG TABLET    Take 0.5 mg by mouth.    CIPROFLOXACIN HCL (CIPRO) 500 MG TABLET    Take 1 tablet (500 mg total) by mouth 2 (two) times daily.    DIAZEPAM (VALIUM) 10 MG TAB    Take 1 tablet (10 mg total) by mouth once. for 1 dose    HYOSCYAMINE (ANASPAZ,LEVSIN) 0.125 MG TAB    Take 125 mcg by mouth.    IBUPROFEN (ADVIL,MOTRIN) 600 MG TABLET            Past Social History:   Social History     Socioeconomic History    Marital status:    Tobacco Use    Smoking status: Never   Substance and Sexual Activity    Alcohol use: Never       Allergies:   Review of patient's allergies indicates:   Allergen Reactions    Codeine         Family History: History reviewed. No pertinent family history.     Review of Systems:  Review of Systems   Constitutional: Negative.    HENT: Negative.     Eyes: Negative.    Respiratory: Negative.     Cardiovascular: Negative.    Gastrointestinal: Negative.    Endocrine: Negative.    Genitourinary: Negative.    Musculoskeletal: Negative.    Skin: Negative.    Allergic/Immunologic: Negative.    Neurological:  Negative.    Hematological: Negative.    Psychiatric/Behavioral: Negative.         Physical Exam:  Physical Exam  Constitutional:       Appearance: He is normal weight.   HENT:      Head: Normocephalic.      Nose: Nose normal.      Mouth/Throat:      Mouth: Mucous membranes are moist.      Pharynx: Oropharynx is clear.   Eyes:      Extraocular Movements: Extraocular movements intact.      Conjunctiva/sclera: Conjunctivae normal.      Pupils: Pupils are equal, round, and reactive to light.   Cardiovascular:      Rate and Rhythm: Normal rate and regular rhythm.      Pulses: Normal pulses.      Heart sounds: Normal heart sounds.   Pulmonary:      Effort: Pulmonary effort is normal.      Breath sounds: Normal breath sounds.   Abdominal:      General: Abdomen is flat. Bowel sounds are normal.      Palpations: Abdomen is soft.      Hernia: There is no hernia in the right inguinal area or left inguinal area.   Genitourinary:     Penis: Normal. No phimosis, paraphimosis, hypospadias, erythema, tenderness or discharge.       Testes: Normal.         Right: Mass, tenderness or swelling not present. Right testis is descended. Cremasteric reflex is present.          Left: Mass, tenderness or swelling not present. Left testis is descended. Cremasteric reflex is present.       Prostate: Normal.      Rectum: Normal.   Musculoskeletal:         General: Normal range of motion.      Cervical back: Normal range of motion and neck supple.   Lymphadenopathy:      Lower Body: No right inguinal adenopathy. No left inguinal adenopathy.   Skin:     General: Skin is warm and dry.      Capillary Refill: Capillary refill takes less than 2 seconds.   Neurological:      General: No focal deficit present.      Mental Status: He is alert and oriented to person, place, and time. Mental status is at baseline.   Psychiatric:         Mood and Affect: Mood normal.         Behavior: Behavior normal.         Thought Content: Thought content normal.          Judgment: Judgment normal.         Assessment/Plan:     Prostate cancer:  Continue with active surveillance.  Return to clinic in 4 months for repeat PSA.  Patient will be due for repeat biopsy in April of 2024.  No other issues at this time.    I, Dr. Damien Sewell have seen and personally evaluated the patient. I have formulated the plan reviewed all pertinent imaging and clinical data.  I agree with the nurse practitioner's assessment, and I have personally formulated the plan for this patient's care as described by the midlevel.    Problem List Items Addressed This Visit    None

## 2024-01-30 ENCOUNTER — TELEPHONE (OUTPATIENT)
Dept: UROLOGY | Facility: CLINIC | Age: 69
End: 2024-01-30
Payer: MEDICARE

## 2024-01-30 NOTE — TELEPHONE ENCOUNTER
Contacted pt, he is experiencing frequency and requesting meds. Advised pt that he needs to come in for urinalysis/bs. Pt verbalized understanding. BJP    ----- Message from Tess Manzano sent at 1/30/2024  1:03 PM CST -----  Patient is calling to have medication called into pharmacy for frequent urination please call him back at [phone] or [phone].              Thanks  sahara

## 2024-01-31 ENCOUNTER — CLINICAL SUPPORT (OUTPATIENT)
Dept: UROLOGY | Facility: CLINIC | Age: 69
End: 2024-01-31
Payer: MEDICARE

## 2024-01-31 ENCOUNTER — TELEPHONE (OUTPATIENT)
Dept: UROLOGY | Facility: CLINIC | Age: 69
End: 2024-01-31

## 2024-01-31 DIAGNOSIS — N41.9 PROSTATITIS, UNSPECIFIED PROSTATITIS TYPE: Primary | ICD-10-CM

## 2024-01-31 LAB
BILIRUBIN, UA POC OHS: NEGATIVE
BLOOD, UA POC OHS: NEGATIVE
CLARITY, UA POC OHS: CLEAR
COLOR, UA POC OHS: YELLOW
GLUCOSE, UA POC OHS: NEGATIVE
KETONES, UA POC OHS: NEGATIVE
LEUKOCYTES, UA POC OHS: ABNORMAL
NITRITE, UA POC OHS: NEGATIVE
PH, UA POC OHS: 6.5
PROTEIN, UA POC OHS: NEGATIVE
SPECIFIC GRAVITY, UA POC OHS: 1.02
UROBILINOGEN, UA POC OHS: 0.2

## 2024-01-31 PROCEDURE — 81003 URINALYSIS AUTO W/O SCOPE: CPT | Mod: QW,S$GLB,, | Performed by: UROLOGY

## 2024-01-31 NOTE — TELEPHONE ENCOUNTER
Called pt to let him know we will send his urine for a cx, he is not having symptoms currently, so we will call him in a couple days with urine culture results and antibiotics order. Pt voiced understanding. Lorri Monroe

## 2024-01-31 NOTE — PROGRESS NOTES
Pt came by to drop off a urine sample, lab collect specimen and we will call pt with results. Lorri Monroe

## 2024-02-02 DIAGNOSIS — N39.0 URINARY TRACT INFECTION WITHOUT HEMATURIA, SITE UNSPECIFIED: Primary | ICD-10-CM

## 2024-02-02 LAB — URINE CULTURE, ROUTINE: NORMAL

## 2024-02-02 RX ORDER — AMOXICILLIN AND CLAVULANATE POTASSIUM 875; 125 MG/1; MG/1
1 TABLET, FILM COATED ORAL 2 TIMES DAILY
Qty: 20 TABLET | Refills: 0 | Status: SHIPPED | OUTPATIENT
Start: 2024-02-02 | End: 2024-02-12

## 2024-05-22 ENCOUNTER — CLINICAL SUPPORT (OUTPATIENT)
Dept: UROLOGY | Facility: CLINIC | Age: 69
End: 2024-05-22
Payer: MEDICARE

## 2024-05-22 DIAGNOSIS — R97.20 ELEVATED PSA: Primary | ICD-10-CM

## 2024-05-22 LAB — PSA, DIAGNOSTIC: 4.42 NG/ML (ref 0.1–4)

## 2024-05-22 PROCEDURE — 36415 COLL VENOUS BLD VENIPUNCTURE: CPT | Mod: S$GLB,,, | Performed by: UROLOGY

## 2024-05-22 NOTE — PROGRESS NOTES
Pt came in for PSA lab draw, it was drawn in sterile fashion on  his left forearm. It was completed and pt was relaese to go home.

## 2024-05-23 ENCOUNTER — OFFICE VISIT (OUTPATIENT)
Dept: UROLOGY | Facility: CLINIC | Age: 69
End: 2024-05-23
Payer: MEDICARE

## 2024-05-23 VITALS
DIASTOLIC BLOOD PRESSURE: 65 MMHG | SYSTOLIC BLOOD PRESSURE: 132 MMHG | BODY MASS INDEX: 24.48 KG/M2 | HEIGHT: 67 IN | HEART RATE: 65 BPM | WEIGHT: 156 LBS

## 2024-05-23 DIAGNOSIS — N52.9 ERECTILE DYSFUNCTION, UNSPECIFIED ERECTILE DYSFUNCTION TYPE: Primary | ICD-10-CM

## 2024-05-23 DIAGNOSIS — R97.20 ELEVATED PSA: ICD-10-CM

## 2024-05-23 PROCEDURE — 3008F BODY MASS INDEX DOCD: CPT | Mod: CPTII,S$GLB,, | Performed by: UROLOGY

## 2024-05-23 PROCEDURE — 3075F SYST BP GE 130 - 139MM HG: CPT | Mod: CPTII,S$GLB,, | Performed by: UROLOGY

## 2024-05-23 PROCEDURE — 99214 OFFICE O/P EST MOD 30 MIN: CPT | Mod: S$GLB,,, | Performed by: UROLOGY

## 2024-05-23 PROCEDURE — 1101F PT FALLS ASSESS-DOCD LE1/YR: CPT | Mod: CPTII,S$GLB,, | Performed by: UROLOGY

## 2024-05-23 PROCEDURE — 3288F FALL RISK ASSESSMENT DOCD: CPT | Mod: CPTII,S$GLB,, | Performed by: UROLOGY

## 2024-05-23 PROCEDURE — 1160F RVW MEDS BY RX/DR IN RCRD: CPT | Mod: CPTII,S$GLB,, | Performed by: UROLOGY

## 2024-05-23 PROCEDURE — 3078F DIAST BP <80 MM HG: CPT | Mod: CPTII,S$GLB,, | Performed by: UROLOGY

## 2024-05-23 PROCEDURE — 1159F MED LIST DOCD IN RCRD: CPT | Mod: CPTII,S$GLB,, | Performed by: UROLOGY

## 2024-06-26 ENCOUNTER — TELEPHONE (OUTPATIENT)
Dept: UROLOGY | Facility: CLINIC | Age: 69
End: 2024-06-26
Payer: MEDICARE

## 2024-06-26 NOTE — TELEPHONE ENCOUNTER
Pt calling for us to send out pain medication for his rectum. I informed pt we cannot send this out that he would need to call pcp or GI.    ----- Message from Taya Limon sent at 6/26/2024  9:56 AM CDT -----  Contact: self  Patient is requesting a call back regarding asking can something be sent out for pain in rectum . Please call back at [phone]       [address] [facility]  [address] [facility] [address]  Phone: [phone] Fax: [phone]

## 2024-11-15 ENCOUNTER — CLINICAL SUPPORT (OUTPATIENT)
Dept: UROLOGY | Facility: CLINIC | Age: 69
End: 2024-11-15
Payer: MEDICARE

## 2024-11-15 DIAGNOSIS — R97.20 ELEVATED PSA: Primary | ICD-10-CM

## 2024-11-15 LAB — PSA, DIAGNOSTIC: 5.19 NG/ML (ref 0.1–4)

## 2024-11-15 NOTE — PROGRESS NOTES
PSA level collected with 22 gauge needle from left AC using aseptic technique. Patient tolerated well, and is aware of f/u apt.

## 2024-11-19 ENCOUNTER — OFFICE VISIT (OUTPATIENT)
Dept: UROLOGY | Facility: CLINIC | Age: 69
End: 2024-11-19
Payer: MEDICARE

## 2024-11-19 VITALS
HEART RATE: 63 BPM | SYSTOLIC BLOOD PRESSURE: 145 MMHG | DIASTOLIC BLOOD PRESSURE: 76 MMHG | HEIGHT: 67 IN | WEIGHT: 156 LBS | BODY MASS INDEX: 24.48 KG/M2

## 2024-11-19 DIAGNOSIS — C61 PROSTATE CANCER: Primary | ICD-10-CM

## 2024-11-19 PROCEDURE — 3008F BODY MASS INDEX DOCD: CPT | Mod: CPTII,,, | Performed by: UROLOGY

## 2024-11-19 PROCEDURE — 3077F SYST BP >= 140 MM HG: CPT | Mod: CPTII,,, | Performed by: UROLOGY

## 2024-11-19 PROCEDURE — 1101F PT FALLS ASSESS-DOCD LE1/YR: CPT | Mod: CPTII,,, | Performed by: UROLOGY

## 2024-11-19 PROCEDURE — 99214 OFFICE O/P EST MOD 30 MIN: CPT | Mod: S$PBB,,, | Performed by: UROLOGY

## 2024-11-19 PROCEDURE — 3078F DIAST BP <80 MM HG: CPT | Mod: CPTII,,, | Performed by: UROLOGY

## 2024-11-19 PROCEDURE — 3288F FALL RISK ASSESSMENT DOCD: CPT | Mod: CPTII,,, | Performed by: UROLOGY

## 2024-11-19 PROCEDURE — 1159F MED LIST DOCD IN RCRD: CPT | Mod: CPTII,,, | Performed by: UROLOGY

## 2024-11-19 NOTE — PROGRESS NOTES
Subjective:       Patient ID: Kyle Krishna Jr is a 69 y.o. male.    Chief Complaint: No chief complaint on file.      HPI: 69-year-old male patient presenting to clinic for 4 month follow up for prostate cancer on active surveillance diagnosis of low-grade prostate cancer [place] 6 in 1 of 12 cores.  Patient's initial prostate biopsy was on 04/25/2023 which had 1/12 core [place] 6.    11/15/2024  5.19   01/17/2024  5.02  09/06/2023  5.65  01/26/2023  5.37     04/25/2023 initial biopsy 1/12 cores [place] 6 36% PSA was 5.37       Past Medical History:   Past Medical History:   Diagnosis Date    MS (multiple sclerosis)        Past Surgical Historical:   Past Surgical History:   Procedure Laterality Date    PENILE PROSTHESIS IMPLANT  2009    PENILE PROSTHESIS IMPLANT          Medications:   Medication List with Changes/Refills   Current Medications    ALPRAZOLAM (XANAX) 0.25 MG TABLET    TAKE 1 TABLET BY MOUTH THREE TIMES DAILY AS NEEDED FOR PANIC ATTACKS    ALPRAZOLAM (XANAX) 0.5 MG TABLET    Take 0.5 mg by mouth.    HYOSCYAMINE (ANASPAZ,LEVSIN) 0.125 MG TAB    Take 125 mcg by mouth.    IBUPROFEN (ADVIL,MOTRIN) 600 MG TABLET       Discontinued Medications    CIPROFLOXACIN HCL (CIPRO) 500 MG TABLET    Take 1 tablet (500 mg total) by mouth 2 (two) times daily.    DIAZEPAM (VALIUM) 10 MG TAB    Take 1 tablet (10 mg total) by mouth once. for 1 dose        Past Social History:   Social History     Socioeconomic History    Marital status:    Tobacco Use    Smoking status: Never   Substance and Sexual Activity    Alcohol use: Never       Allergies:   Review of patient's allergies indicates:   Allergen Reactions    Codeine         Family History: No family history on file.     Review of Systems:  Review of Systems   Constitutional: Negative.    HENT: Negative.     Eyes: Negative.    Respiratory: Negative.     Cardiovascular: Negative.    Gastrointestinal: Negative.    Endocrine: Negative.    Genitourinary: Negative.     Musculoskeletal: Negative.    Skin: Negative.    Allergic/Immunologic: Negative.    Neurological: Negative.    Hematological: Negative.    Psychiatric/Behavioral: Negative.         Physical Exam:  Physical Exam  Constitutional:       Appearance: He is normal weight.   HENT:      Head: Normocephalic.      Nose: Nose normal.      Mouth/Throat:      Mouth: Mucous membranes are moist.      Pharynx: Oropharynx is clear.   Eyes:      Extraocular Movements: Extraocular movements intact.      Conjunctiva/sclera: Conjunctivae normal.      Pupils: Pupils are equal, round, and reactive to light.   Cardiovascular:      Rate and Rhythm: Normal rate and regular rhythm.      Pulses: Normal pulses.      Heart sounds: Normal heart sounds.   Pulmonary:      Effort: Pulmonary effort is normal.      Breath sounds: Normal breath sounds.   Abdominal:      General: Abdomen is flat. Bowel sounds are normal.      Palpations: Abdomen is soft.      Hernia: There is no hernia in the right inguinal area or left inguinal area.   Genitourinary:     Penis: Normal. No phimosis, paraphimosis, hypospadias, erythema, tenderness or discharge.       Testes: Normal.         Right: Mass, tenderness or swelling not present. Right testis is descended. Cremasteric reflex is present.          Left: Mass, tenderness or swelling not present. Left testis is descended. Cremasteric reflex is present.       Prostate: Normal.      Rectum: Normal.   Musculoskeletal:         General: Normal range of motion.      Cervical back: Normal range of motion and neck supple.   Lymphadenopathy:      Lower Body: No right inguinal adenopathy. No left inguinal adenopathy.   Skin:     General: Skin is warm and dry.      Capillary Refill: Capillary refill takes less than 2 seconds.   Neurological:      General: No focal deficit present.      Mental Status: He is alert and oriented to person, place, and time. Mental status is at baseline.   Psychiatric:         Mood and Affect:  Mood normal.         Behavior: Behavior normal.         Thought Content: Thought content normal.         Judgment: Judgment normal.         Assessment/Plan:     Prostate cancer active surveillance:  Patient's most recent PSA was 5.19.  Initial prostate biopsy in April 20, 2023.  We will set the patient up for restaging biopsy of the next available date in clinic    I, Dr. Damien Sewell have seen and personally evaluated the patient. I have formulated the plan reviewed all pertinent imaging and clinical data.  I agree with the nurse practitioner's assessment, and I have personally formulated the plan for this patient's care as described by the midlevel.  Problem List Items Addressed This Visit    None  Visit Diagnoses       Prostate cancer    -  Primary    Relevant Orders    Transrectal Ultrasound w/ Biopsy

## 2024-12-02 ENCOUNTER — CLINICAL SUPPORT (OUTPATIENT)
Dept: UROLOGY | Facility: CLINIC | Age: 69
End: 2024-12-02
Payer: MEDICARE

## 2024-12-02 DIAGNOSIS — C61 PROSTATE CANCER: Primary | ICD-10-CM

## 2024-12-02 RX ORDER — CIPROFLOXACIN 500 MG/1
500 TABLET ORAL 2 TIMES DAILY
Qty: 8 TABLET | Refills: 0 | Status: SHIPPED | OUTPATIENT
Start: 2024-12-02 | End: 2024-12-06

## 2024-12-02 NOTE — PROGRESS NOTES
Care team updated. Medications, medical and surgical history updated as well as family history.    Consents and education completed for TRUS BX at [facility] on 12/19/24. Pt given highlighted written instructions after I verbally went over them with him. Pt advised to call with any questions or concerns, before or after procedure.

## 2024-12-03 ENCOUNTER — TELEPHONE (OUTPATIENT)
Dept: UROLOGY | Facility: CLINIC | Age: 69
End: 2024-12-03
Payer: MEDICARE

## 2024-12-03 NOTE — TELEPHONE ENCOUNTER
RTC, informed pt that Dr. Sewell advised pt to take an enema which cleans out the bottom portion of his bowls, pt is requesting he take an oral medication like mag citrate however informed pt there is an increased risk for infection. Pt states he used miralax last prostate biopsy with a different provider and it worked just fine for him, he would rather go that route. I told him the provider is okay with it, however reminded him there is an increased risk for infection. Pt voiced understanding.     ----- Message from Nurse Francois sent at 12/2/2024  4:39 PM CST -----  Contact: Kyle Acharya, I know nothing about pt being told he can take PO medication instead. I did not get the consents ready since I was off the end of last week. This would be a BL, NP or Donato decision.  ----- Message -----  From: Aleksandra Monroe MA  Sent: 12/2/2024  12:58 PM CST  To: Priscila Marshall LPN      ----- Message -----  From: Adelina Seo  Sent: 12/2/2024   9:04 AM CST  To: Donato Quezada Staff    Patient is calling in regards to he says that the last time that he had this procedure performed, he was told that he can take something over the counter to flush him out. He says that he will not be taking an enema. Call back is [phone]

## 2024-12-18 ENCOUNTER — TELEPHONE (OUTPATIENT)
Dept: UROLOGY | Facility: CLINIC | Age: 69
End: 2024-12-18
Payer: MEDICARE

## 2024-12-18 NOTE — TELEPHONE ENCOUNTER
Appointment for prostate biopsy scheduled for 8 AM tomorrow confirmed. Arrival time of 7:45 AM given. Instructed to begin cipro today & to take 1 in the AM prior to procedure & to administer enema at least 2 hours prior to procedure. Patient stated that he was told that he may take mag citrate instead of administering enema. Verbalized understanding.

## 2024-12-19 ENCOUNTER — PROCEDURE VISIT (OUTPATIENT)
Dept: UROLOGY | Facility: CLINIC | Age: 69
End: 2024-12-19
Payer: MEDICARE

## 2024-12-19 VITALS
RESPIRATION RATE: 20 BRPM | SYSTOLIC BLOOD PRESSURE: 131 MMHG | BODY MASS INDEX: 25.15 KG/M2 | HEART RATE: 56 BPM | WEIGHT: 160.25 LBS | HEIGHT: 67 IN | OXYGEN SATURATION: 99 % | DIASTOLIC BLOOD PRESSURE: 65 MMHG

## 2024-12-19 DIAGNOSIS — C61 PROSTATE CANCER: ICD-10-CM

## 2024-12-19 NOTE — PROCEDURES
"Transrectal Ultrasound w/ Biopsy    Date/Time: 12/19/2024 8:00 AM    Performed by: Damien Sewell MD  Authorized by: Irais Brown NP    Consent Done?:  Yes (Written)  Time out: Immediately prior to procedure a "time out" was called to verify the correct patient, procedure, equipment, support staff and site/side marked as required.    Indications: Elevated PSA    Position:  Left lateral  Anesthesia:  Pudendal nerve block  Patient sedated: No    Prostate Size:  23g  Left [place] Biopsies: 2  Left Mid Biopsies: 2  Left [place] Biopsies: 2  Right Base Biopsies: 2  Right Mid Biopsies: 2  Right [place] Biopsies: 2  Total Biopsies:  12    Patient tolerance:  Patient tolerated the procedure well with no immediate complications     Patient was brought to the procedure room placed on the table in left lateral decubitus position lidocaine jelly was instilled into the rectum the ultrasound probe was advanced to level of prostate and a total of 10 cc of lidocaine was injected into the bilateral nam prostatic fossa.  A standard 12 core prostate biopsy was obtained patient tolerated the procedure well there were no complications    "

## 2024-12-19 NOTE — PATIENT INSTRUCTIONS
NO STRENUOUS ACTIVITY FOR 3 DAYS  NO SEXUAL INTERCOURSE FOR 3 DAYS  YOU MAY NOTICE BLOOD IN URINE OR SEMEN FOR SEVERAL DAYS                What to Expect After a Prostate Biopsy    You may have mild bleeding from the rectum or urine for about 1 week to 1 month, or in your ejaculate for several months. This bleeding is normal and expected, and it will stop. You may have mild discomfort in your rectal or urethral area for 24-48 hours.    You cannot do any strenuous lifting, straining, or exercising for 72 hours. You may return to full activity 3 days after the biopsy.    You may continue to take all your regular medications after the procedure except for the blood thinners.    You may resume all blood-thinning medications once you no longer see any bleeding or whenever your physician prescribing the medication says it is all right to do so. You may take Tylenol if you have a fever and your temperature is less than 100° F or if you have some discomfort.    You will receive a call from the Urology Department at [facility] with the results of your prostate biopsy within one week.    Signs and Symptoms to Report    Call your [facility] urologist at [phone] if you develop any of the following:  Temperature greater than 101°  F  Inability to urinate  A large amount of bleeding from the rectum or in the urine  Persistent or severe pain    After hours or on weekends, you may reach a urology resident on call at this number: [phone].

## 2024-12-26 ENCOUNTER — OFFICE VISIT (OUTPATIENT)
Dept: UROLOGY | Facility: CLINIC | Age: 69
End: 2024-12-26
Payer: MEDICARE

## 2024-12-26 VITALS
OXYGEN SATURATION: 97 % | BODY MASS INDEX: 25.11 KG/M2 | SYSTOLIC BLOOD PRESSURE: 124 MMHG | HEART RATE: 63 BPM | HEIGHT: 67 IN | WEIGHT: 160 LBS | DIASTOLIC BLOOD PRESSURE: 78 MMHG

## 2024-12-26 DIAGNOSIS — C61 PROSTATE CANCER: Primary | ICD-10-CM

## 2024-12-26 PROCEDURE — 1160F RVW MEDS BY RX/DR IN RCRD: CPT | Mod: CPTII,,, | Performed by: UROLOGY

## 2024-12-26 PROCEDURE — 99214 OFFICE O/P EST MOD 30 MIN: CPT | Mod: S$PBB,,, | Performed by: UROLOGY

## 2024-12-26 PROCEDURE — 1126F AMNT PAIN NOTED NONE PRSNT: CPT | Mod: CPTII,,, | Performed by: UROLOGY

## 2024-12-26 PROCEDURE — 3078F DIAST BP <80 MM HG: CPT | Mod: CPTII,,, | Performed by: UROLOGY

## 2024-12-26 PROCEDURE — 1101F PT FALLS ASSESS-DOCD LE1/YR: CPT | Mod: CPTII,,, | Performed by: UROLOGY

## 2024-12-26 PROCEDURE — 3074F SYST BP LT 130 MM HG: CPT | Mod: CPTII,,, | Performed by: UROLOGY

## 2024-12-26 PROCEDURE — 1159F MED LIST DOCD IN RCRD: CPT | Mod: CPTII,,, | Performed by: UROLOGY

## 2024-12-26 PROCEDURE — 3008F BODY MASS INDEX DOCD: CPT | Mod: CPTII,,, | Performed by: UROLOGY

## 2024-12-26 PROCEDURE — 3288F FALL RISK ASSESSMENT DOCD: CPT | Mod: CPTII,,, | Performed by: UROLOGY

## 2024-12-26 NOTE — PROGRESS NOTES
Subjective:       Patient ID: Kyle Krishna Jr is a 69 y.o. male.    Chief Complaint: No chief complaint on file.      HPI:  69-year-old male on active surveillance for prostate cancer he had 1 core [place] 6 back in April of 2023 repeat biopsy in December of 2024 reveals no prostate cancer patient's PSA is in the 4 and 5 range patient is doing well after biopsy has no complaints    Past Medical History:   Past Medical History:   Diagnosis Date    Allergy     MS (multiple sclerosis)     Prostate cancer     Stroke        Past Surgical Historical:   Past Surgical History:   Procedure Laterality Date    CARPAL TUNNEL RELEASE Right     COLONOSCOPY      HIP REPLACEMENT ARTHROPLASTY Bilateral     PENILE PROSTHESIS IMPLANT      x3    PROSTATE SURGERY      bx- initial 04/25/23 & restaging    TUMOR REMOVAL Right     back of thigh    WRIST SURGERY Right     ligament repair        Medications:   Medication List with Changes/Refills   Current Medications    ALPRAZOLAM (XANAX) 0.25 MG TABLET    TAKE 1 TABLET BY MOUTH THREE TIMES DAILY AS NEEDED FOR PANIC ATTACKS    ALPRAZOLAM (XANAX) 0.5 MG TABLET    Take 0.5 mg by mouth 3 (three) times daily as needed.    HYOSCYAMINE (ANASPAZ,LEVSIN) 0.125 MG TAB    Take 125 mcg by mouth as needed.    IBUPROFEN (ADVIL,MOTRIN) 600 MG TABLET    as needed.        Past Social History:   Social History     Socioeconomic History    Marital status:    Tobacco Use    Smoking status: Never   Substance and Sexual Activity    Alcohol use: Never       Allergies:   Review of patient's allergies indicates:   Allergen Reactions    Codeine         Family History:   Family History   Problem Relation Name Age of Onset    Lung cancer Father      Heart disease Mother      Alzheimer's disease Paternal Aunt      Alzheimer's disease Paternal Uncle      Lung cancer Sister      Prostate cancer Cousin      Colon cancer Cousin      Heart disease Son          Review of Systems:  Review of Systems    Constitutional:  Negative for activity change and appetite change.   HENT:  Negative for congestion and dental problem.    Eyes:  Negative for visual disturbance.   Respiratory:  Negative for chest tightness and shortness of breath.    Cardiovascular:  Negative for chest pain.   Gastrointestinal:  Negative for abdominal distention and abdominal pain.   Genitourinary:  Negative for decreased urine volume, difficulty urinating, dysuria, enuresis, flank pain, frequency, genital sores, hematuria, penile discharge, penile pain, penile swelling, scrotal swelling, testicular pain and urgency.   Musculoskeletal:  Negative for back pain and neck pain.   Skin:  Negative for color change.   Neurological:  Negative for dizziness.   Hematological:  Negative for adenopathy.   Psychiatric/Behavioral:  Negative for agitation, behavioral problems and confusion.        Physical Exam:  Physical Exam  Constitutional:       General: He is not in acute distress.     Appearance: He is well-developed.   HENT:      Head: Normocephalic and atraumatic.      Nose: Nose normal.   Eyes:      General: No scleral icterus.     Conjunctiva/sclera: Conjunctivae normal.      Pupils: Pupils are equal, round, and reactive to light.   Neck:      Thyroid: No thyromegaly.      Trachea: No tracheal deviation.   Cardiovascular:      Rate and Rhythm: Normal rate and regular rhythm.      Heart sounds: Normal heart sounds.   Pulmonary:      Effort: Pulmonary effort is normal. No respiratory distress.      Breath sounds: Normal breath sounds. No wheezing or rales.   Abdominal:      General: Bowel sounds are normal. There is no distension.      Palpations: Abdomen is soft.      Tenderness: There is no abdominal tenderness. There is no guarding or rebound.   Genitourinary:     Penis: Normal. No tenderness.       Prostate: Normal.   Musculoskeletal:         General: No deformity. Normal range of motion.      Cervical back: Neck supple.   Lymphadenopathy:       Cervical: No cervical adenopathy.   Skin:     General: Skin is warm and dry.      Findings: No erythema or rash.   Neurological:      Mental Status: He is alert and oriented to person, place, and time.      Cranial Nerves: No cranial nerve deficit.   Psychiatric:         Behavior: Behavior normal.         Assessment/Plan:       Problem List Items Addressed This Visit    None         69-year-old male on active surveillance for prostate cancer with a PSA in the mid 5 range 1 core [place] 6 found in April of 2023 his most recent prostate biopsy a week ago was benign return to clinic in 6 months with PSA

## 2025-06-03 DIAGNOSIS — C61 PROSTATE CANCER: Primary | ICD-10-CM

## 2025-06-27 ENCOUNTER — CLINICAL SUPPORT (OUTPATIENT)
Dept: FAMILY MEDICINE | Facility: CLINIC | Age: 70
End: 2025-06-27
Payer: MEDICARE

## 2025-06-27 ENCOUNTER — RESULTS FOLLOW-UP (OUTPATIENT)
Dept: UROLOGY | Facility: CLINIC | Age: 70
End: 2025-06-27

## 2025-06-27 DIAGNOSIS — C61 PROSTATE CANCER: ICD-10-CM

## 2025-06-27 LAB — PSA, DIAGNOSTIC: 7.07 NG/ML (ref 0–4)

## 2025-07-03 ENCOUNTER — OFFICE VISIT (OUTPATIENT)
Dept: UROLOGY | Facility: CLINIC | Age: 70
End: 2025-07-03
Payer: MEDICARE

## 2025-07-03 VITALS
BODY MASS INDEX: 25.11 KG/M2 | WEIGHT: 160 LBS | SYSTOLIC BLOOD PRESSURE: 133 MMHG | HEART RATE: 61 BPM | RESPIRATION RATE: 19 BRPM | HEIGHT: 67 IN | DIASTOLIC BLOOD PRESSURE: 66 MMHG

## 2025-07-03 DIAGNOSIS — C61 PROSTATE CANCER: Primary | ICD-10-CM

## 2025-07-03 PROCEDURE — 1159F MED LIST DOCD IN RCRD: CPT | Mod: CPTII,,, | Performed by: UROLOGY

## 2025-07-03 PROCEDURE — 99214 OFFICE O/P EST MOD 30 MIN: CPT | Mod: S$PBB,,, | Performed by: UROLOGY

## 2025-07-03 PROCEDURE — 1126F AMNT PAIN NOTED NONE PRSNT: CPT | Mod: CPTII,,, | Performed by: UROLOGY

## 2025-07-03 PROCEDURE — 3008F BODY MASS INDEX DOCD: CPT | Mod: CPTII,,, | Performed by: UROLOGY

## 2025-07-03 PROCEDURE — 3078F DIAST BP <80 MM HG: CPT | Mod: CPTII,,, | Performed by: UROLOGY

## 2025-07-03 PROCEDURE — 3075F SYST BP GE 130 - 139MM HG: CPT | Mod: CPTII,,, | Performed by: UROLOGY

## 2025-07-03 RX ORDER — TAMSULOSIN HYDROCHLORIDE 0.4 MG/1
1 CAPSULE ORAL
COMMUNITY
Start: 2025-04-22

## 2025-07-03 NOTE — PROGRESS NOTES
Subjective:       Patient ID: Kyle Krishna Jr is a 69 y.o. male.    Chief Complaint: Prostate Cancer      HPI:  69-year-old male on active surveillance for prostate cancer. He had 1 core [place] 6 back in April of 2023.  Restaging biopsy in December of 2024 was benign.  Patient's PSA is in the 4 and 5 range in the past.  Most recent PSA has increased to 7.070.  He has seen an increase in frequency and some postvoid dribbling.  Not currently taking any medication    06/27/2025   7.070  11/15/2024    5.19  05/22/2024   4.42    Past Medical History:   Past Medical History:   Diagnosis Date    Allergy     MS (multiple sclerosis)     Prostate cancer     Stroke        Past Surgical Historical:   Past Surgical History:   Procedure Laterality Date    CARPAL TUNNEL RELEASE Right     COLONOSCOPY      HIP REPLACEMENT ARTHROPLASTY Bilateral     PENILE PROSTHESIS IMPLANT      x3    PROSTATE SURGERY      bx- initial 04/25/23 & restaging    TUMOR REMOVAL Right     back of thigh    WRIST SURGERY Right     ligament repair        Medications:   Medication List with Changes/Refills   Current Medications    ALPRAZOLAM (XANAX) 0.25 MG TABLET    TAKE 1 TABLET BY MOUTH THREE TIMES DAILY AS NEEDED FOR PANIC ATTACKS    ALPRAZOLAM (XANAX) 0.5 MG TABLET    Take 0.5 mg by mouth 3 (three) times daily as needed.    HYOSCYAMINE (ANASPAZ,LEVSIN) 0.125 MG TAB    Take 125 mcg by mouth as needed.    IBUPROFEN (ADVIL,MOTRIN) 600 MG TABLET    as needed.    TAMSULOSIN (FLOMAX) 0.4 MG CAP    Take 1 capsule by mouth.        Past Social History:   Social History     Socioeconomic History    Marital status:    Tobacco Use    Smoking status: Never   Substance and Sexual Activity    Alcohol use: Never       Allergies:   Review of patient's allergies indicates:   Allergen Reactions    Codeine         Family History:   Family History   Problem Relation Name Age of Onset    Lung cancer Father      Heart disease Mother      Alzheimer's disease Paternal  Aunt      Alzheimer's disease Paternal Uncle      Lung cancer Sister      Prostate cancer Cousin      Colon cancer Cousin      Heart disease Son          Review of Systems:  Review of Systems   Constitutional:  Negative for activity change and appetite change.   HENT:  Negative for congestion and dental problem.    Eyes:  Negative for visual disturbance.   Respiratory:  Negative for chest tightness and shortness of breath.    Cardiovascular:  Negative for chest pain.   Gastrointestinal:  Negative for abdominal distention and abdominal pain.   Genitourinary:  Negative for decreased urine volume, difficulty urinating, dysuria, enuresis, flank pain, frequency, genital sores, hematuria, penile discharge, penile pain, penile swelling, scrotal swelling, testicular pain and urgency.   Musculoskeletal:  Negative for back pain and neck pain.   Skin:  Negative for color change.   Neurological:  Negative for dizziness.   Hematological:  Negative for adenopathy.   Psychiatric/Behavioral:  Negative for agitation, behavioral problems and confusion.        Physical Exam:  Physical Exam  Constitutional:       General: He is not in acute distress.     Appearance: He is well-developed.   HENT:      Head: Normocephalic and atraumatic.      Nose: Nose normal.   Eyes:      General: No scleral icterus.     Conjunctiva/sclera: Conjunctivae normal.      Pupils: Pupils are equal, round, and reactive to light.   Neck:      Thyroid: No thyromegaly.      Trachea: No tracheal deviation.   Cardiovascular:      Rate and Rhythm: Normal rate and regular rhythm.      Heart sounds: Normal heart sounds.   Pulmonary:      Effort: Pulmonary effort is normal. No respiratory distress.      Breath sounds: Normal breath sounds. No wheezing or rales.   Abdominal:      General: Bowel sounds are normal. There is no distension.      Palpations: Abdomen is soft.      Tenderness: There is no abdominal tenderness. There is no guarding or rebound.    Genitourinary:     Penis: Normal. No tenderness.       Prostate: Normal.   Musculoskeletal:         General: No deformity. Normal range of motion.      Cervical back: Neck supple.   Lymphadenopathy:      Cervical: No cervical adenopathy.   Skin:     General: Skin is warm and dry.      Findings: No erythema or rash.   Neurological:      Mental Status: He is alert and oriented to person, place, and time.      Cranial Nerves: No cranial nerve deficit.   Psychiatric:         Behavior: Behavior normal.         Assessment/Plan:     Prostate cancer on active surveillance:  Most recent PSA has increased to 7.070.  Restaging biopsy in December of 2024 was benign.  At this point we will place an order for prostate MRI for further evaluation.    I, Dr. Damien Sewell have seen and personally evaluated the patient. I have formulated the plan reviewed all pertinent imaging and clinical data.  I agree with the nurse practitioner's assessment, and I have personally formulated the plan for this patient's care as described by the midlevel.  Problem List Items Addressed This Visit    None  Visit Diagnoses         Prostate cancer    -  Primary    Relevant Orders    MRI Prostate W W/O Contrast

## 2025-07-31 ENCOUNTER — TELEPHONE (OUTPATIENT)
Dept: UROLOGY | Facility: CLINIC | Age: 70
End: 2025-07-31
Payer: MEDICARE

## 2025-07-31 NOTE — TELEPHONE ENCOUNTER
Spoke with pt and informed him we will call him once results have been reviewed.     Copied from CRM #[id number]. Topic: General Inquiry - Test Results  >> Jul 31, 2025 11:09 AM Tess wrote:  Type:  Test Results    Who Called: patient   Name of Test (Lab/Mammo/Etc):  labs  Date of Test:   Ordering Provider:  Dr Damien Sewell  Where the test was performed:   Would the patient rather a call back or a response via [facility]? Call back   Best Call Back Number:   [phone]    Additional Information:

## 2025-08-05 ENCOUNTER — TELEPHONE (OUTPATIENT)
Dept: UROLOGY | Facility: CLINIC | Age: 70
End: 2025-08-05
Payer: MEDICARE

## 2025-08-05 NOTE — TELEPHONE ENCOUNTER
Attempted to contact, no answer. JAMESKetty CANELA    Copied from CRM #[id number]. Topic: General Inquiry - Test Results  >> Aug 5, 2025 11:27 ALANNAH Bahena wrote:  ...Type:  Patient Requesting Results    Who Called: Kyle Krishna Jr  What is the call regarding?: pt would like the results for the MRI.  Would the patient rather a call back or a response via [facility]?  call  Best Call Back Number: [phone] (home)  Additional Information:

## 2025-08-08 ENCOUNTER — TELEPHONE (OUTPATIENT)
Dept: UROLOGY | Facility: CLINIC | Age: 70
End: 2025-08-08
Payer: MEDICARE

## 2025-08-08 NOTE — TELEPHONE ENCOUNTER
Please see telephone encounter for today 8/8/2025. Spoke with patient & informed.                 ----- Message from Damien Sewell MD sent at 8/6/2025  5:58 PM CDT -----  Please inform the patient that his MRI is normal and there is no need for rebiopsy at this time we will see him back in 5 months at his scheduled PSA appointment  ----- Message -----  From: Anila Dailey  Sent: 7/30/2025   7:39 AM CDT  To: Damien Sewell MD

## 2025-08-08 NOTE — TELEPHONE ENCOUNTER
Returned request for call back in regards to MRI of the prostate results that were normal & that there is no need for re biopsy at this time. Also informed to keep his scheduled appointment in 5 months with DR. Sewell. Verbalized understanding.               Copied from CRM #[id number]. Topic: General Inquiry - Return Call  >> Aug 8, 2025  9:39 AM Rikki wrote:  Type:  Patient Returning Call    Who Called:Kyle Krishna Jr  Who Left Message for Patient:Ximena Trevino  Does the patient know what this is regarding?:test results  Would the patient rather a call back or a response via [facility]? call  Best Call Back Number: [phone]    Additional Information: